# Patient Record
Sex: FEMALE | Race: WHITE | NOT HISPANIC OR LATINO | URBAN - METROPOLITAN AREA
[De-identification: names, ages, dates, MRNs, and addresses within clinical notes are randomized per-mention and may not be internally consistent; named-entity substitution may affect disease eponyms.]

---

## 2019-04-09 ENCOUNTER — APPOINTMENT (RX ONLY)
Dept: URBAN - METROPOLITAN AREA CLINIC 106 | Facility: CLINIC | Age: 74
Setting detail: DERMATOLOGY
End: 2019-04-09

## 2019-04-09 DIAGNOSIS — D17 BENIGN LIPOMATOUS NEOPLASM: ICD-10-CM

## 2019-04-09 DIAGNOSIS — L82.1 OTHER SEBORRHEIC KERATOSIS: ICD-10-CM

## 2019-04-09 DIAGNOSIS — L40.0 PSORIASIS VULGARIS: ICD-10-CM

## 2019-04-09 PROBLEM — D17.1 BENIGN LIPOMATOUS NEOPLASM OF SKIN AND SUBCUTANEOUS TISSUE OF TRUNK: Status: ACTIVE | Noted: 2019-04-09

## 2019-04-09 PROCEDURE — 99202 OFFICE O/P NEW SF 15 MIN: CPT

## 2019-04-09 PROCEDURE — ? ADDITIONAL NOTES

## 2019-04-09 PROCEDURE — ? COUNSELING

## 2019-04-09 ASSESSMENT — LOCATION DETAILED DESCRIPTION DERM
LOCATION DETAILED: RIGHT CAVUM CONCHA
LOCATION DETAILED: LEFT CAVUM CONCHA
LOCATION DETAILED: RIGHT SUPERIOR MEDIAL MIDBACK
LOCATION DETAILED: LEFT SUPERIOR UPPER BACK

## 2019-04-09 ASSESSMENT — LOCATION ZONE DERM
LOCATION ZONE: TRUNK
LOCATION ZONE: TRUNK
LOCATION ZONE: EAR

## 2019-04-09 ASSESSMENT — LOCATION SIMPLE DESCRIPTION DERM
LOCATION SIMPLE: RIGHT LOWER BACK
LOCATION SIMPLE: RIGHT EAR
LOCATION SIMPLE: LEFT EAR
LOCATION SIMPLE: LEFT UPPER BACK

## 2019-04-09 NOTE — PROCEDURE: ADDITIONAL NOTES
Additional Notes: Prescribed cortisporin solution 10mL bottle, apply 2 drops into affected ear bid x 7days.  Medication called into [facility] pharmacy
Detail Level: Simple

## 2019-04-16 ENCOUNTER — NEW PATIENT COMPREHENSIVE (OUTPATIENT)
Dept: URBAN - METROPOLITAN AREA CLINIC 35 | Facility: CLINIC | Age: 74
End: 2019-04-16

## 2019-04-16 DIAGNOSIS — H52.03: ICD-10-CM

## 2019-04-16 DIAGNOSIS — Z96.1: ICD-10-CM

## 2019-04-16 DIAGNOSIS — H04.123: ICD-10-CM

## 2019-04-16 DIAGNOSIS — H43.813: ICD-10-CM

## 2019-04-16 DIAGNOSIS — H52.203: ICD-10-CM

## 2019-04-16 DIAGNOSIS — H52.4: ICD-10-CM

## 2019-04-16 PROCEDURE — A4262 TEMPORARY TEAR DUCT PLUG: HCPCS

## 2019-04-16 PROCEDURE — 92004 COMPRE OPH EXAM NEW PT 1/>: CPT

## 2019-04-16 PROCEDURE — 92134 CPTRZ OPH DX IMG PST SGM RTA: CPT

## 2019-04-16 PROCEDURE — 68761C PUNCTUM PLUG /COLLAGEN, EACH

## 2019-04-16 PROCEDURE — 92015 DETERMINE REFRACTIVE STATE: CPT

## 2019-04-16 ASSESSMENT — VISUAL ACUITY
OD_CC: J3
OU_SC: J10-
OD_SC: J12>
OS_SC: J12>
OU_SC: 20/30-1
OU_CC: J3
OD_SC: 20/30-2
OS_CC: 20/30-1
OS_SC: 20/60-1
OS_CC: J6-

## 2019-04-16 ASSESSMENT — KERATOMETRY
OD_K2POWER_DIOPTERS: 42.75
OS_K1POWER_DIOPTERS: 44
OD_AXISANGLE2_DEGREES: 100
OD_K1POWER_DIOPTERS: 43.5
OS_AXISANGLE_DEGREES: 42
OS_AXISANGLE2_DEGREES: 132
OD_AXISANGLE_DEGREES: 10
OS_K2POWER_DIOPTERS: 43.5

## 2019-04-16 ASSESSMENT — TONOMETRY
OD_IOP_MMHG: 12
OS_IOP_MMHG: 12

## 2021-04-12 ENCOUNTER — APPOINTMENT (RX ONLY)
Dept: URBAN - METROPOLITAN AREA CLINIC 131 | Facility: CLINIC | Age: 76
Setting detail: DERMATOLOGY
End: 2021-04-12

## 2021-04-12 DIAGNOSIS — Z41.9 ENCOUNTER FOR PROCEDURE FOR PURPOSES OTHER THAN REMEDYING HEALTH STATE, UNSPECIFIED: ICD-10-CM

## 2021-04-12 DIAGNOSIS — L73.8 OTHER SPECIFIED FOLLICULAR DISORDERS: ICD-10-CM

## 2021-04-12 DIAGNOSIS — L98.8 OTHER SPECIFIED DISORDERS OF THE SKIN AND SUBCUTANEOUS TISSUE: ICD-10-CM

## 2021-04-12 PROCEDURE — 99212 OFFICE O/P EST SF 10 MIN: CPT | Mod: NC

## 2021-04-12 PROCEDURE — ? COUNSELING

## 2021-04-12 PROCEDURE — ? PRESCRIPTION

## 2021-04-12 PROCEDURE — ? COSMETIC CONSULTATION: FILLERS

## 2021-04-12 PROCEDURE — ? ADDITIONAL NOTES

## 2021-04-12 RX ORDER — TRETIONIN 1 MG/G
CREAM TOPICAL
Qty: 1 | Refills: 2 | Status: ERX | COMMUNITY
Start: 2021-04-12

## 2021-04-12 RX ADMIN — TRETIONIN 1: 1 CREAM TOPICAL at 00:00

## 2021-04-12 ASSESSMENT — LOCATION SIMPLE DESCRIPTION DERM
LOCATION SIMPLE: LEFT FOREHEAD
LOCATION SIMPLE: INFERIOR FOREHEAD
LOCATION SIMPLE: RIGHT FOREHEAD

## 2021-04-12 ASSESSMENT — LOCATION DETAILED DESCRIPTION DERM
LOCATION DETAILED: INFERIOR MID FOREHEAD
LOCATION DETAILED: RIGHT INFERIOR MEDIAL FOREHEAD
LOCATION DETAILED: LEFT MEDIAL FOREHEAD

## 2021-04-12 ASSESSMENT — LOCATION ZONE DERM: LOCATION ZONE: FACE

## 2021-04-12 NOTE — PROCEDURE: ADDITIONAL NOTES
Render Risk Assessment In Note?: no
Detail Level: Simple
Additional Notes: 1 syringe in each cheek recommended (2 total for cheeks), 1 syringe restylane to marionettes (3 restylane total) , also discussed instalift for future, photo of cosmetic sheet by LB in chart

## 2021-04-15 ENCOUNTER — APPOINTMENT (RX ONLY)
Dept: URBAN - METROPOLITAN AREA CLINIC 131 | Facility: CLINIC | Age: 76
Setting detail: DERMATOLOGY
End: 2021-04-15

## 2021-04-15 VITALS — TEMPERATURE: 97.3 F

## 2021-04-15 DIAGNOSIS — Z41.9 ENCOUNTER FOR PROCEDURE FOR PURPOSES OTHER THAN REMEDYING HEALTH STATE, UNSPECIFIED: ICD-10-CM

## 2021-04-15 PROCEDURE — ? FILLERS

## 2021-04-15 NOTE — PROCEDURE: FILLERS
Marionette Lines Filler  Volume In Cc: 0
Anesthesia Volume In Cc: 0.5
Lot #: [id number]
Expiration Date (Month Year): 8/31/23
Include Cannula Information In Note?: No
Additional Anesthesia Volume In Cc: 0.4
Filler: Angeles Hagan
Detail Level: Detailed
Cheeks Filler Volume In Cc: 2
Consent: Written consent obtained. Risks include but not limited to bruising, beading, irregular texture, ulceration, infection, allergic reaction, scar formation, incomplete augmentation, temporary nature, procedural pain.
Map Statment: See [address] for Complete Details
Post-Care Instructions: Patient instructed to apply ice to reduce swelling.
Lot #: [id number]
Marionette Lines Filler  Volume In Cc: 1
Expiration Date (Month Year): 9/30/23
Anesthesia Type: 0.2% lidocaine (mixed within filler)

## 2021-05-03 ENCOUNTER — APPOINTMENT (RX ONLY)
Dept: URBAN - METROPOLITAN AREA CLINIC 131 | Facility: CLINIC | Age: 76
Setting detail: DERMATOLOGY
End: 2021-05-03

## 2021-05-03 VITALS — TEMPERATURE: 98.1 F

## 2021-05-03 DIAGNOSIS — Z41.9 ENCOUNTER FOR PROCEDURE FOR PURPOSES OTHER THAN REMEDYING HEALTH STATE, UNSPECIFIED: ICD-10-CM

## 2021-05-03 PROCEDURE — ? COSMETIC CONSULTATION: FILLERS

## 2021-05-03 PROCEDURE — ? RECOMMENDATIONS

## 2021-05-03 PROCEDURE — ? ADDITIONAL NOTES

## 2021-05-03 NOTE — PROCEDURE: ADDITIONAL NOTES
Additional Notes: 2 weeks cosmetic follow up, pt pleased with results. Before and After photos shown to pt.
Detail Level: Simple
Render Risk Assessment In Note?: no

## 2021-05-24 NOTE — HPI: SKIN LESION
What Type Of Note Output Would You Prefer (Optional)?: Standard Output
How Severe Is Your Skin Lesion?: mild
Has Your Skin Lesion Been Treated?: not been treated
Is This A New Presentation, Or A Follow-Up?: Growth
[As Noted in HPI] : as noted in HPI
[Negative] : Heme/Lymph

## 2021-05-25 ENCOUNTER — APPOINTMENT (RX ONLY)
Dept: URBAN - METROPOLITAN AREA CLINIC 131 | Facility: CLINIC | Age: 76
Setting detail: DERMATOLOGY
End: 2021-05-25

## 2021-05-25 VITALS — TEMPERATURE: 97.9 F

## 2021-05-25 DIAGNOSIS — Z41.9 ENCOUNTER FOR PROCEDURE FOR PURPOSES OTHER THAN REMEDYING HEALTH STATE, UNSPECIFIED: ICD-10-CM

## 2021-05-25 PROCEDURE — ? FILLERS

## 2021-05-25 NOTE — PROCEDURE: FILLERS
Cheeks Filler Volume In Cc: 0
Detail Level: Detailed
Filler: Angeles Hagan
Consent: Written consent obtained. Risks include but not limited to bruising, beading, irregular texture, ulceration, infection, allergic reaction, scar formation, incomplete augmentation, temporary nature, procedural pain.
Post-Care Instructions: Patient instructed to apply ice to reduce swelling.
Include Cannula Information In Note?: No
Cheeks Filler Volume In Cc: 1
Lot #: [id number]
Map Statment: See [address] for Complete Details
Anesthesia Type: 0.2% lidocaine (mixed within filler)
Expiration Date (Month Year): 09/30/23
Anesthesia Volume In Cc: 0.5
Additional Anesthesia Volume In Cc: 0.4

## 2021-06-30 ENCOUNTER — APPOINTMENT (RX ONLY)
Dept: URBAN - METROPOLITAN AREA CLINIC 131 | Facility: CLINIC | Age: 76
Setting detail: DERMATOLOGY
End: 2021-06-30

## 2021-06-30 DIAGNOSIS — Z41.9 ENCOUNTER FOR PROCEDURE FOR PURPOSES OTHER THAN REMEDYING HEALTH STATE, UNSPECIFIED: ICD-10-CM

## 2021-06-30 PROCEDURE — ? FILLERS

## 2021-06-30 PROCEDURE — ? BOTOX

## 2021-06-30 ASSESSMENT — LOCATION DETAILED DESCRIPTION DERM
LOCATION DETAILED: RIGHT INFERIOR MEDIAL FOREHEAD
LOCATION DETAILED: GLABELLA
LOCATION DETAILED: LEFT CENTRAL EYEBROW

## 2021-06-30 ASSESSMENT — LOCATION SIMPLE DESCRIPTION DERM
LOCATION SIMPLE: GLABELLA
LOCATION SIMPLE: LEFT EYEBROW
LOCATION SIMPLE: RIGHT FOREHEAD

## 2021-06-30 ASSESSMENT — LOCATION ZONE DERM: LOCATION ZONE: FACE

## 2021-06-30 NOTE — PROCEDURE: FILLERS
Jawline Filler Volume In Cc: 1
Tear Troughs Filler  Volume In Cc: 0
Include Cannula Information In Note?: No
Map Statment: See [address] for Complete Details
Lot #: [id number]
Expiration Date (Month Year): 5/14/22
Anesthesia Type: 0.2% lidocaine (mixed within filler)
Anesthesia Volume In Cc: 0.5
Additional Anesthesia Volume In Cc: 0.4
Detail Level: Detailed
Filler: Juvederm Ultra Plus XC
Consent: Written consent obtained. Risks include but not limited to bruising, beading, irregular texture, ulceration, infection, allergic reaction, scar formation, incomplete augmentation, temporary nature, procedural pain.
Post-Care Instructions: Patient instructed to apply ice to reduce swelling.

## 2021-06-30 NOTE — PROCEDURE: BOTOX
Additional Area 5 Units: 0
Post-Care Instructions: Patient instructed to not lie down for 4 hours and limit physical activity for 24 hours. Patient instructed not to travel by airplane for 48 hours.
Dilution (U/0.1 Cc): 4
Show Right And Left Periorbital Units: No
Show Additional Area 3: Yes
Glabellar Complex Units: 15
Detail Level: Detailed
Consent: Written consent obtained. Risks include but not limited to lid/brow ptosis, bruising, swelling, diplopia, temporary effect, incomplete chemical denervation.

## 2023-03-09 ENCOUNTER — APPOINTMENT (RX ONLY)
Dept: URBAN - METROPOLITAN AREA CLINIC 106 | Facility: CLINIC | Age: 78
Setting detail: DERMATOLOGY
End: 2023-03-09

## 2023-03-09 DIAGNOSIS — Z41.9 ENCOUNTER FOR PROCEDURE FOR PURPOSES OTHER THAN REMEDYING HEALTH STATE, UNSPECIFIED: ICD-10-CM

## 2023-03-09 DIAGNOSIS — L98.8 OTHER SPECIFIED DISORDERS OF THE SKIN AND SUBCUTANEOUS TISSUE: ICD-10-CM

## 2023-03-09 PROCEDURE — ? COSMETIC CONSULTATION: BOTULINUM TOXIN

## 2023-03-09 PROCEDURE — ? COSMETIC CONSULTATION: FILLERS

## 2023-03-09 PROCEDURE — ? COUNSELING

## 2023-03-09 PROCEDURE — ? BOTOX

## 2023-03-09 PROCEDURE — ? FILLERS

## 2023-03-09 ASSESSMENT — LOCATION ZONE DERM: LOCATION ZONE: FACE

## 2023-03-09 ASSESSMENT — LOCATION DETAILED DESCRIPTION DERM: LOCATION DETAILED: GLABELLA

## 2023-03-09 ASSESSMENT — LOCATION SIMPLE DESCRIPTION DERM: LOCATION SIMPLE: GLABELLA

## 2023-03-09 NOTE — PROCEDURE: FILLERS
Include Cannula Information In Note?: No
Brows Filler  Volume In Cc: 0
Additional Anesthesia Volume In Cc: 6
Inventory Information: This plan will send filler information to inventory based on the fillers you select. Multiple fillers can be sent but you must ensure you select the appropriate fillers in the inventory tab.
Topical Anesthesia?: 20% benzocaine, 8% lidocaine, and 8% tetracaine
Detail Level: Detailed
Number Of Syringes (Required For Inventory): 1
Show Inventory Tab: Show
Filler: Juvederm Ultra Plus XC
Map Statment: See [address] for Complete Details
Consent: Written consent obtained. Risks include but not limited to bruising, beading, irregular texture, ulceration, infection, allergic reaction, scar formation, incomplete augmentation, temporary nature, procedural pain.
Vermilion Lips Filler Volume In Cc: 0.6
Post-Care Instructions: Patient instructed to apply ice to reduce swelling.
Marionette Lines Filler  Volume In Cc: 0.4

## 2023-03-09 NOTE — PROCEDURE: BOTOX
Lcl Root Units: 0
Consent: Written consent obtained. Risks include but not limited to lid/brow ptosis, bruising, swelling, diplopia, temporary effect, incomplete chemical denervation.
Show Additional Area 3: Yes
Detail Level: Detailed
Glabellar Complex Units: 12
Post-Care Instructions: Patient instructed to not lie down for 4 hours and limit physical activity for 24 hours.
Dilution (U/0.1 Cc): 4
Show Ucl Units: No
Additional Area 3 Location: base of nose
Show Inventory Tab: Show
Additional Area 2 Location: upper lip
Lot #: [id number]
Incrementing Botox Units: By 0.5 Units
Additional Area 1 Location: chin